# Patient Record
Sex: MALE | Race: WHITE
[De-identification: names, ages, dates, MRNs, and addresses within clinical notes are randomized per-mention and may not be internally consistent; named-entity substitution may affect disease eponyms.]

---

## 2018-08-28 ENCOUNTER — HOSPITAL ENCOUNTER (EMERGENCY)
Dept: HOSPITAL 53 - M ED | Age: 23
Discharge: HOME | End: 2018-08-28
Payer: COMMERCIAL

## 2018-08-28 DIAGNOSIS — N48.89: ICD-10-CM

## 2018-08-28 DIAGNOSIS — R10.32: Primary | ICD-10-CM

## 2018-08-28 LAB
ANION GAP: 7 MEQ/L (ref 8–16)
BASO #: 0 10^3/UL (ref 0–0.2)
BASO %: 0.2 % (ref 0–1)
BLOOD UREA NITROGEN: 16 MG/DL (ref 7–18)
CALCIUM LEVEL: 8.5 MG/DL (ref 8.5–10.1)
CARBON DIOXIDE LEVEL: 25 MEQ/L (ref 21–32)
CHLORIDE LEVEL: 108 MEQ/L (ref 98–107)
CREATININE FOR GFR: 1.12 MG/DL (ref 0.7–1.3)
EOS #: 0.1 10^3/UL (ref 0–0.5)
EOSINOPHIL NFR BLD AUTO: 2.6 % (ref 0–3)
GFR SERPL CREATININE-BSD FRML MDRD: > 60 ML/MIN/{1.73_M2} (ref 60–?)
GLUCOSE, FASTING: 93 MG/DL (ref 70–100)
HEMATOCRIT: 42.3 % (ref 42–52)
HEMOGLOBIN: 14.4 G/DL (ref 13.5–17.5)
IMMATURE GRANULOCYTE %: 0.4 % (ref 0–3)
LEUKOCYTE ESTERASE UR AUTO RFX: NEGATIVE
LYMPH #: 1.7 10^3/UL (ref 1.5–6.5)
LYMPH %: 31.3 % (ref 24–44)
MEAN CORPUSCULAR HEMOGLOBIN: 29.3 PG (ref 27–33)
MEAN CORPUSCULAR HGB CONC: 34 G/DL (ref 32–36.5)
MEAN CORPUSCULAR VOLUME: 86 FL (ref 80–96)
MONO #: 0.4 10^3/UL (ref 0–0.8)
MONO %: 7.8 % (ref 0–5)
NEUTROPHILS #: 3.1 10^3/UL (ref 1.8–7.7)
NEUTROPHILS %: 57.7 % (ref 36–66)
NRBC BLD AUTO-RTO: 0 % (ref 0–0)
PLATELET COUNT, AUTOMATED: 237 10^3/UL (ref 150–450)
POTASSIUM SERUM: 3.8 MEQ/L (ref 3.5–5.1)
RED BLOOD COUNT: 4.92 10^6/UL (ref 4.3–6.1)
RED CELL DISTRIBUTION WIDTH: 12.5 % (ref 11.5–14.5)
SODIUM LEVEL: 140 MEQ/L (ref 136–145)
SPECIFIC GRAVITY UR AUTO RFX: 1.03 (ref 1–1.03)
SQUAM EPITHELIAL CELL UR AURFX: 0 /HPF (ref 0–6)
WHITE BLOOD COUNT: 5.4 10^3/UL (ref 4–10)

## 2018-08-28 PROCEDURE — 80048 BASIC METABOLIC PNL TOTAL CA: CPT

## 2019-09-23 ENCOUNTER — HOSPITAL ENCOUNTER (EMERGENCY)
Dept: HOSPITAL 53 - M ED | Age: 24
Discharge: HOME | End: 2019-09-23
Payer: COMMERCIAL

## 2019-09-23 VITALS — DIASTOLIC BLOOD PRESSURE: 73 MMHG | SYSTOLIC BLOOD PRESSURE: 136 MMHG

## 2019-09-23 VITALS — BODY MASS INDEX: 30.99 KG/M2 | WEIGHT: 216.49 LBS | HEIGHT: 70 IN

## 2019-09-23 DIAGNOSIS — S90.01XA: ICD-10-CM
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
## 2019-09-23 DIAGNOSIS — S90.31XA: Primary | ICD-10-CM

## 2019-09-23 NOTE — REP
RIGHT FOOT COMPLETE:  09/23/2019.

 

Clinical history:  Trauma, stepped foot playing soccer.

 

Findings:  Four views show the calcaneus and talus grossly intact.  Subtalar

joints were unremarkable.  Soft tissue swelling about the ankle to the dorsally

and laterally.  Small avulsions that may be present adjacent to the distal tip

lateral malleolus.  Ankle series would further evaluate for acuity talonavicular

and calcaneocuboid joints, tarsal bones and articulations, metatarsals and

phalanges as well as all of those joints were intact.  No visible fracture of the

mid foot or forefoot.

 

Impression:

1.  Soft tissue swelling anterolateral to the ankle and hind foot, I could not

exclude small avulsions chronic versus acute from the distal tip lateral

malleolus.  Ankle series recommended.

 

2.  Talus, calcaneus, tarsal bones, metatarsals and phalanges unremarkable.

 

 

Electronically Signed by

Pelon Reyes MD 09/23/2019 05:42 P

## 2019-09-24 NOTE — REP
Clinical:  Trauma .

 

Technique:  AP, lateral, bilateral oblique views.

 

Findings:  No acute fracture or dislocation.  Old avulsion injury along the

lateral ankle.  Joint spaces and ankle mortise appear stable.  No subcutaneous

emphysema or radiodense foreign body.

 

Impression:

Old avulsion injury at the lateral malleolus.

No acute fracture or dislocation.

 

 

Electronically Signed by

Victor Hugo Moore MD 09/24/2019 01:25 A